# Patient Record
Sex: FEMALE
[De-identification: names, ages, dates, MRNs, and addresses within clinical notes are randomized per-mention and may not be internally consistent; named-entity substitution may affect disease eponyms.]

---

## 2023-04-08 ENCOUNTER — NURSE TRIAGE (OUTPATIENT)
Dept: OTHER | Facility: CLINIC | Age: 68
End: 2023-04-08

## 2023-04-08 NOTE — TELEPHONE ENCOUNTER
Location of patient: [place]    Subjective: Caller states \"She has a cough, runny nose and body aches. \"     Daughter reports that pt and her spouse were at the dentist office on 4/6/23. She reports that they were up having breakfast this am.    Pain Severity:   body aches are mild    Temperature:  no fever    What has been tried: nothing so far    Recommended disposition: contact the on call doctor    Care advice provided, patient verbalizes understanding; denies any other questions or concerns. Outcome: Contact Provider - done and Provider looked up pt by name and will prescribe med(s) for pt.     This triage is a result of a call to the [address]      Reason for Disposition   [1] HIGH RISK for severe COVID complications (e.g., weak immune system, age > 59 years, obesity with BMI 30 or higher, pregnant, chronic lung disease or other chronic medical condition) AND [2] COVID symptoms (e.g., cough, fever)  (Exceptions: Already seen by PCP and no new or worsening symptoms.)    Protocols used: Coronavirus (COVID-19) Diagnosed or Suspected-ADULT-